# Patient Record
Sex: MALE | Race: OTHER | HISPANIC OR LATINO | URBAN - METROPOLITAN AREA
[De-identification: names, ages, dates, MRNs, and addresses within clinical notes are randomized per-mention and may not be internally consistent; named-entity substitution may affect disease eponyms.]

---

## 2022-06-24 ENCOUNTER — EMERGENCY (EMERGENCY)
Facility: HOSPITAL | Age: 57
LOS: 1 days | Discharge: HOME | End: 2022-06-24
Attending: EMERGENCY MEDICINE
Payer: SELF-PAY

## 2022-06-24 VITALS
SYSTOLIC BLOOD PRESSURE: 100 MMHG | OXYGEN SATURATION: 99 % | HEART RATE: 92 BPM | TEMPERATURE: 99 F | DIASTOLIC BLOOD PRESSURE: 55 MMHG | RESPIRATION RATE: 20 BRPM | WEIGHT: 174.17 LBS | HEIGHT: 71 IN

## 2022-06-24 PROCEDURE — 70486 CT MAXILLOFACIAL W/O DYE: CPT | Mod: 26,MA

## 2022-06-24 PROCEDURE — 90471 IMMUNIZATION ADMIN: CPT

## 2022-06-24 PROCEDURE — 99285 EMERGENCY DEPT VISIT HI MDM: CPT

## 2022-06-24 PROCEDURE — 73070 X-RAY EXAM OF ELBOW: CPT

## 2022-06-24 PROCEDURE — 72125 CT NECK SPINE W/O DYE: CPT | Mod: MA

## 2022-06-24 PROCEDURE — 73070 X-RAY EXAM OF ELBOW: CPT | Mod: 26,50

## 2022-06-24 PROCEDURE — 90715 TDAP VACCINE 7 YRS/> IM: CPT

## 2022-06-24 PROCEDURE — 72125 CT NECK SPINE W/O DYE: CPT | Mod: 26,MA

## 2022-06-24 PROCEDURE — 73562 X-RAY EXAM OF KNEE 3: CPT | Mod: 26,50

## 2022-06-24 PROCEDURE — 99284 EMERGENCY DEPT VISIT MOD MDM: CPT | Mod: 25

## 2022-06-24 PROCEDURE — 70450 CT HEAD/BRAIN W/O DYE: CPT | Mod: MA

## 2022-06-24 PROCEDURE — 70450 CT HEAD/BRAIN W/O DYE: CPT | Mod: 26,MA

## 2022-06-24 PROCEDURE — 70486 CT MAXILLOFACIAL W/O DYE: CPT | Mod: MA

## 2022-06-24 PROCEDURE — 73562 X-RAY EXAM OF KNEE 3: CPT

## 2022-06-24 RX ORDER — ACETAMINOPHEN 500 MG
650 TABLET ORAL ONCE
Refills: 0 | Status: COMPLETED | OUTPATIENT
Start: 2022-06-24 | End: 2022-06-24

## 2022-06-24 RX ORDER — TETANUS TOXOID, REDUCED DIPHTHERIA TOXOID AND ACELLULAR PERTUSSIS VACCINE, ADSORBED 5; 2.5; 8; 8; 2.5 [IU]/.5ML; [IU]/.5ML; UG/.5ML; UG/.5ML; UG/.5ML
0.5 SUSPENSION INTRAMUSCULAR ONCE
Refills: 0 | Status: COMPLETED | OUTPATIENT
Start: 2022-06-24 | End: 2022-06-24

## 2022-06-24 RX ADMIN — TETANUS TOXOID, REDUCED DIPHTHERIA TOXOID AND ACELLULAR PERTUSSIS VACCINE, ADSORBED 0.5 MILLILITER(S): 5; 2.5; 8; 8; 2.5 SUSPENSION INTRAMUSCULAR at 11:39

## 2022-06-24 RX ADMIN — Medication 650 MILLIGRAM(S): at 11:38
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
## 2022-06-24 NOTE — ED STATDOCS - CARE PROVIDER_API CALL
Opal Brasher)  Plastic Surgery  [address] [place] [address]  Phone: [phone]  Fax: [phone]  Follow Up Time: Urgent
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
## 2022-06-24 NOTE — ED STATDOCS - PATIENT PORTAL LINK FT
You can access the FollowMyHealth Patient Portal offered by [facility] by registering at the following website: [email or web address]. By joining [employer]’s FollowMyHealth portal, you will also be able to view your health information using other applications (apps) compatible with our system.
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
## 2022-06-24 NOTE — ED STATDOCS - MUSCULOSKELETAL, MLM
range of motion is not limited. (+) depression to right mandible w/ swelling. Able to move all extremities. Normal gait.
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
## 2023-11-19 ENCOUNTER — EMERGENCY (EMERGENCY)
Facility: HOSPITAL | Age: 58
LOS: 1 days | Discharge: HOME | End: 2023-11-19
Attending: STUDENT IN AN ORGANIZED HEALTH CARE EDUCATION/TRAINING PROGRAM
Payer: COMMERCIAL

## 2023-11-19 VITALS
WEIGHT: 176.15 LBS | HEIGHT: 70 IN | TEMPERATURE: 98 F | DIASTOLIC BLOOD PRESSURE: 74 MMHG | SYSTOLIC BLOOD PRESSURE: 119 MMHG | RESPIRATION RATE: 18 BRPM | OXYGEN SATURATION: 99 % | HEART RATE: 73 BPM

## 2023-11-19 LAB
ALBUMIN SERPL ELPH-MCNC: 4.1 G/DL — SIGNIFICANT CHANGE UP (ref 3.3–5.2)
ALBUMIN SERPL ELPH-MCNC: 4.1 G/DL — SIGNIFICANT CHANGE UP (ref 3.3–5.2)
ALP SERPL-CCNC: 90 U/L — SIGNIFICANT CHANGE UP (ref 40–120)
ALP SERPL-CCNC: 90 U/L — SIGNIFICANT CHANGE UP (ref 40–120)
ALT FLD-CCNC: 28 U/L — SIGNIFICANT CHANGE UP
ALT FLD-CCNC: 28 U/L — SIGNIFICANT CHANGE UP
ANION GAP SERPL CALC-SCNC: 10 MMOL/L — SIGNIFICANT CHANGE UP (ref 5–17)
ANION GAP SERPL CALC-SCNC: 10 MMOL/L — SIGNIFICANT CHANGE UP (ref 5–17)
AST SERPL-CCNC: 15 U/L — SIGNIFICANT CHANGE UP
AST SERPL-CCNC: 15 U/L — SIGNIFICANT CHANGE UP
BASOPHILS # BLD AUTO: 0.03 K/UL — SIGNIFICANT CHANGE UP (ref 0–0.2)
BASOPHILS # BLD AUTO: 0.03 K/UL — SIGNIFICANT CHANGE UP (ref 0–0.2)
BASOPHILS NFR BLD AUTO: 0.5 % — SIGNIFICANT CHANGE UP (ref 0–2)
BASOPHILS NFR BLD AUTO: 0.5 % — SIGNIFICANT CHANGE UP (ref 0–2)
BILIRUB SERPL-MCNC: 0.4 MG/DL — SIGNIFICANT CHANGE UP (ref 0.4–2)
BILIRUB SERPL-MCNC: 0.4 MG/DL — SIGNIFICANT CHANGE UP (ref 0.4–2)
BUN SERPL-MCNC: 22.4 MG/DL — HIGH (ref 8–20)
BUN SERPL-MCNC: 22.4 MG/DL — HIGH (ref 8–20)
CALCIUM SERPL-MCNC: 9 MG/DL — SIGNIFICANT CHANGE UP (ref 8.4–10.5)
CALCIUM SERPL-MCNC: 9 MG/DL — SIGNIFICANT CHANGE UP (ref 8.4–10.5)
CHLORIDE SERPL-SCNC: 98 MMOL/L — SIGNIFICANT CHANGE UP (ref 96–108)
CHLORIDE SERPL-SCNC: 98 MMOL/L — SIGNIFICANT CHANGE UP (ref 96–108)
CO2 SERPL-SCNC: 28 MMOL/L — SIGNIFICANT CHANGE UP (ref 22–29)
CO2 SERPL-SCNC: 28 MMOL/L — SIGNIFICANT CHANGE UP (ref 22–29)
CREAT SERPL-MCNC: 1.19 MG/DL — SIGNIFICANT CHANGE UP (ref 0.5–1.3)
CREAT SERPL-MCNC: 1.19 MG/DL — SIGNIFICANT CHANGE UP (ref 0.5–1.3)
EGFR: 71 ML/MIN/1.73M2 — SIGNIFICANT CHANGE UP
EGFR: 71 ML/MIN/1.73M2 — SIGNIFICANT CHANGE UP
EOSINOPHIL # BLD AUTO: 0.18 K/UL — SIGNIFICANT CHANGE UP (ref 0–0.5)
EOSINOPHIL # BLD AUTO: 0.18 K/UL — SIGNIFICANT CHANGE UP (ref 0–0.5)
EOSINOPHIL NFR BLD AUTO: 2.9 % — SIGNIFICANT CHANGE UP (ref 0–6)
EOSINOPHIL NFR BLD AUTO: 2.9 % — SIGNIFICANT CHANGE UP (ref 0–6)
GLUCOSE SERPL-MCNC: 261 MG/DL — HIGH (ref 70–99)
GLUCOSE SERPL-MCNC: 261 MG/DL — HIGH (ref 70–99)
HCT VFR BLD CALC: 38 % — LOW (ref 39–50)
HCT VFR BLD CALC: 38 % — LOW (ref 39–50)
HGB BLD-MCNC: 12.9 G/DL — LOW (ref 13–17)
HGB BLD-MCNC: 12.9 G/DL — LOW (ref 13–17)
IMM GRANULOCYTES NFR BLD AUTO: 0.2 % — SIGNIFICANT CHANGE UP (ref 0–0.9)
IMM GRANULOCYTES NFR BLD AUTO: 0.2 % — SIGNIFICANT CHANGE UP (ref 0–0.9)
LYMPHOCYTES # BLD AUTO: 1.4 K/UL — SIGNIFICANT CHANGE UP (ref 1–3.3)
LYMPHOCYTES # BLD AUTO: 1.4 K/UL — SIGNIFICANT CHANGE UP (ref 1–3.3)
LYMPHOCYTES # BLD AUTO: 22.5 % — SIGNIFICANT CHANGE UP (ref 13–44)
LYMPHOCYTES # BLD AUTO: 22.5 % — SIGNIFICANT CHANGE UP (ref 13–44)
MAGNESIUM SERPL-MCNC: 1.9 MG/DL — SIGNIFICANT CHANGE UP (ref 1.6–2.6)
MAGNESIUM SERPL-MCNC: 1.9 MG/DL — SIGNIFICANT CHANGE UP (ref 1.6–2.6)
MCHC RBC-ENTMCNC: 29.7 PG — SIGNIFICANT CHANGE UP (ref 27–34)
MCHC RBC-ENTMCNC: 29.7 PG — SIGNIFICANT CHANGE UP (ref 27–34)
MCHC RBC-ENTMCNC: 33.9 GM/DL — SIGNIFICANT CHANGE UP (ref 32–36)
MCHC RBC-ENTMCNC: 33.9 GM/DL — SIGNIFICANT CHANGE UP (ref 32–36)
MCV RBC AUTO: 87.4 FL — SIGNIFICANT CHANGE UP (ref 80–100)
MCV RBC AUTO: 87.4 FL — SIGNIFICANT CHANGE UP (ref 80–100)
MONOCYTES # BLD AUTO: 0.43 K/UL — SIGNIFICANT CHANGE UP (ref 0–0.9)
MONOCYTES # BLD AUTO: 0.43 K/UL — SIGNIFICANT CHANGE UP (ref 0–0.9)
MONOCYTES NFR BLD AUTO: 6.9 % — SIGNIFICANT CHANGE UP (ref 2–14)
MONOCYTES NFR BLD AUTO: 6.9 % — SIGNIFICANT CHANGE UP (ref 2–14)
NEUTROPHILS # BLD AUTO: 4.17 K/UL — SIGNIFICANT CHANGE UP (ref 1.8–7.4)
NEUTROPHILS # BLD AUTO: 4.17 K/UL — SIGNIFICANT CHANGE UP (ref 1.8–7.4)
NEUTROPHILS NFR BLD AUTO: 67 % — SIGNIFICANT CHANGE UP (ref 43–77)
NEUTROPHILS NFR BLD AUTO: 67 % — SIGNIFICANT CHANGE UP (ref 43–77)
PLATELET # BLD AUTO: 185 K/UL — SIGNIFICANT CHANGE UP (ref 150–400)
PLATELET # BLD AUTO: 185 K/UL — SIGNIFICANT CHANGE UP (ref 150–400)
POTASSIUM SERPL-MCNC: 4.7 MMOL/L — SIGNIFICANT CHANGE UP (ref 3.5–5.3)
POTASSIUM SERPL-MCNC: 4.7 MMOL/L — SIGNIFICANT CHANGE UP (ref 3.5–5.3)
POTASSIUM SERPL-SCNC: 4.7 MMOL/L — SIGNIFICANT CHANGE UP (ref 3.5–5.3)
POTASSIUM SERPL-SCNC: 4.7 MMOL/L — SIGNIFICANT CHANGE UP (ref 3.5–5.3)
PROT SERPL-MCNC: 6.6 G/DL — SIGNIFICANT CHANGE UP (ref 6.6–8.7)
PROT SERPL-MCNC: 6.6 G/DL — SIGNIFICANT CHANGE UP (ref 6.6–8.7)
RBC # BLD: 4.35 M/UL — SIGNIFICANT CHANGE UP (ref 4.2–5.8)
RBC # BLD: 4.35 M/UL — SIGNIFICANT CHANGE UP (ref 4.2–5.8)
RBC # FLD: 12.9 % — SIGNIFICANT CHANGE UP (ref 10.3–14.5)
RBC # FLD: 12.9 % — SIGNIFICANT CHANGE UP (ref 10.3–14.5)
SODIUM SERPL-SCNC: 136 MMOL/L — SIGNIFICANT CHANGE UP (ref 135–145)
SODIUM SERPL-SCNC: 136 MMOL/L — SIGNIFICANT CHANGE UP (ref 135–145)
WBC # BLD: 6.22 K/UL — SIGNIFICANT CHANGE UP (ref 3.8–10.5)
WBC # BLD: 6.22 K/UL — SIGNIFICANT CHANGE UP (ref 3.8–10.5)
WBC # FLD AUTO: 6.22 K/UL — SIGNIFICANT CHANGE UP (ref 3.8–10.5)
WBC # FLD AUTO: 6.22 K/UL — SIGNIFICANT CHANGE UP (ref 3.8–10.5)

## 2023-11-19 PROCEDURE — 96374 THER/PROPH/DIAG INJ IV PUSH: CPT

## 2023-11-19 PROCEDURE — 85025 COMPLETE CBC W/AUTO DIFF WBC: CPT

## 2023-11-19 PROCEDURE — 36415 COLL VENOUS BLD VENIPUNCTURE: CPT

## 2023-11-19 PROCEDURE — 70450 CT HEAD/BRAIN W/O DYE: CPT | Mod: 26,QQ

## 2023-11-19 PROCEDURE — 99284 EMERGENCY DEPT VISIT MOD MDM: CPT | Mod: 25

## 2023-11-19 PROCEDURE — 80053 COMPREHEN METABOLIC PANEL: CPT

## 2023-11-19 PROCEDURE — 70450 CT HEAD/BRAIN W/O DYE: CPT | Mod: QQ

## 2023-11-19 PROCEDURE — 83735 ASSAY OF MAGNESIUM: CPT

## 2023-11-19 PROCEDURE — 99284 EMERGENCY DEPT VISIT MOD MDM: CPT

## 2023-11-19 RX ORDER — METOCLOPRAMIDE HCL 10 MG
10 TABLET ORAL ONCE
Refills: 0 | Status: COMPLETED | OUTPATIENT
Start: 2023-11-19 | End: 2023-11-19

## 2023-11-19 RX ORDER — SODIUM CHLORIDE 9 MG/ML
1000 INJECTION, SOLUTION INTRAVENOUS ONCE
Refills: 0 | Status: COMPLETED | OUTPATIENT
Start: 2023-11-19 | End: 2023-11-19

## 2023-11-19 RX ADMIN — Medication 10 MILLIGRAM(S): at 19:18

## 2023-11-19 RX ADMIN — SODIUM CHLORIDE 1000 MILLILITER(S): 9 INJECTION, SOLUTION INTRAVENOUS at 19:18

## 2023-11-19 RX ADMIN — SODIUM CHLORIDE 1000 MILLILITER(S): 9 INJECTION, SOLUTION INTRAVENOUS at 20:02

## 2023-11-19 NOTE — ED PROVIDER NOTE - NSFOLLOWUPINSTRUCTIONS_ED_ALL_ED_FT
1) Follow up with your doctor in 1-2 days  2) Return to the ER for worsening or concerning symptoms    Dolor de noemi general sin causa  General Headache Without Cause    El dolor de noemi es un dolor o malestar que se siente en la jennifer de la noemi o del johana. Puede no tener maynor causa específica. Hay muchas causas y tipos de alon de noemi. Los alon de noemi más comunes son los siguientes:    Cefaleas tensionales.  Cefaleas migrañosas.  Cefalea en brotes.  Cefaleas diarias crónicas.    Siga estas indicaciones en gayle casa:  Controle gayle afección para detectar cualquier cambio. Informe a gayle médico acerca de cualquier cambio. Siga estos pasos para controlar gayle afección:        Control del dolor      [place] los medicamentos de venta sandy y los recetados solamente alexis se lo haya indicado el médico.  Cuando sienta dolor de noemi acuéstese en un cuarto oscuro y tranquilo.  Si se lo indican, aplíquese hielo en la noemi y en la jennifer del johana:    Ponga el hielo en maynor bolsa plástica.  Coloque maynor toalla entre la piel y la bolsa.  Coloque el hielo дмитрий 20 minutos, 2 a 3 veces al día.  Si se lo indican, aplique calor en la jennifer afectada. Use la bonnie de calor que el médico le recomiende, alexis maynor compresa de calor húmedo o maynor almohadilla térmica.    Coloque maynor toalla entre la piel y la bonnie de calor.  Aplique calor дмитрий 20 a 30 minutos.  Retire la bonnie de calor si la piel se pone de color limon brillante. [place] es especialmente importante si no puede sentir dolor, calor o frío. Puede correr un riesgo mayor de sufrir quemaduras.  Mantenga las luces tenues si las luces brillantes le molestan o bairon alon de noemi empeoran.        Comida y bebida    Mantenga un horario para las comidas.  Si yuliana alcohol:    Limite la cantidad que yuliana a lo siguiente:     De 0 a 1 medida por día para las mujeres.   De 0 a 2 medidas por día para los hombres.   Esté atento a la cantidad de alcohol que hay en las bebidas que beronica. En los [place], maynor medida equivale a maynor botella de cerveza de 12 oz (355 ml), un vaso de vino de 5 oz (148 ml) o un vaso de maynor bebida alcohólica de stephanie graduación de 1½ oz (44 ml).  Deje de damon cafeína o disminuya la cantidad que consume.        Indicaciones generales     Lleve un diario de los alon de noemi para averiguar qué factores pueden desencadenarlos. Registre, por ejemplo, lo siguiente:    Lo que usted come y yuliana.  El tiempo que duerme.  Algún cambio en gayle dieta o en los medicamentos.  Pruebe algunas técnicas de relajación, alexis los masajes.  Limite el estrés.  Siéntese con la espalda recta y no tense los músculos.  No consuma ningún producto que contenga nicotina o tabaco, alexis cigarrillos, cigarrillos electrónicos y tabaco de mascar. Si necesita ayuda para dejar de consumir, consulte al médico.  Talha actividad física habitualmente alexis se lo haya indicado el médico.  Tenga un horario fijo para dormir. [place]erma entre 7 y 9 horas todas las noches o la cantidad de horas que le haya recomendado el médico.  Concurra a todas las visitas de control alexis se lo haya indicado el médico. [place] es importante.    Comuníquese con un médico si:  Los medicamentos no logran aliviar los síntomas.  Tiene un dolor de noemi que es diferente del dolor de noemi habitual.  Tiene náuseas o vómitos.  Tiene fiebre.    Solicite ayuda inmediatamente si:  El dolor se vuelve intenso rápidamente.  El dolor empeora después de hacer actividad física moderada o intensa.  Ha vomitado repetidas veces.  Presenta rigidez en el johana.  Sufre pérdida de la visión.  Tiene problemas para hablar.  Siente dolor en el delmer o en el oído.  Presenta debilidad muscular o pérdida del control muscular.  Pierde el equilibrio o tiene problemas para caminar.  Sufre mareos o se desmaya.  Experimenta confusión.  Tiene maynor convulsión.    Resumen  El dolor de noemi es un dolor o malestar que se siente en la jennifer de la noemi o del johana.  Hay muchas causas y tipos de alon de noemi. En algunos casos, es posible que no se encuentre la causa.  Lleve un diario de los alon de noemi para averiguar qué factores pueden desencadenarlos. Controle gayle afección para detectar cualquier cambio. Informe a gayle médico acerca de cualquier cambio.  Comuníquese con un médico si tiene un dolor de noemi que es diferente de lo habitual o si los síntomas no se alivian con los medicamentos.  Solicite ayuda de inmediato si el dolor se vuelve intenso, vomita, tiene pérdida de la visión, pierde el equilibrio o tiene maynor convulsión.    NOTAS ADICIONALES E INSTRUCCIONES    Please follow up with your Primary MD in 24-48 hr.  Seek immediate medical care for any new/worsening signs or symptoms.     Document Released: 12/18/2006 Document Revised: 7/8/2019 Document Reviewed: 7/8/2019

## 2023-11-19 NOTE — ED PROVIDER NOTE - PATIENT PORTAL LINK FT
You can access the FollowMyHealth Patient Portal offered by [facility] by registering at the following website: [email or web address]. By joining [employer]’s FollowMyHealth portal, you will also be able to view your health information using other applications (apps) compatible with our system.

## 2023-11-19 NOTE — ED ADULT NURSE NOTE - NSFALLUNIVINTERV_ED_ALL_ED
Bed/Stretcher in lowest position, wheels locked, appropriate side rails in place/Call bell, personal items and telephone in reach/Instruct patient to call for assistance before getting out of bed/chair/stretcher/Non-slip footwear applied when patient is off stretcher/[place] to call system/Physically safe environment - no spills, clutter or unnecessary equipment/Purposeful proactive rounding/Room/bathroom lighting operational, light cord in reach

## 2023-11-19 NOTE — ED ADULT NURSE REASSESSMENT NOTE - NS ED NURSE REASSESS COMMENT FT1
received report from previous RN, assumed care of pt at approx 2300.  pt is here for headache.  is Indonesian speaking, aox4.  awaiting CT scan.  no complaints at this time.  family member at bedside.

## 2023-11-19 NOTE — ED PROVIDER NOTE - PHYSICAL EXAMINATION
Const: Awake, alert and oriented. In no acute distress. Well appearing.  HEENT: NC/AT. Moist mucous membranes.  Eyes: No scleral icterus. EOMI. unequal pupils (ho cataract surgery)  Neck:. Soft and supple. Full ROM without pain.  Cardiac: Regular rate and regular rhythm. +S1/S2. Peripheral pulses 2+ and symmetric. No LE edema.  Resp: Speaking in full sentences. No evidence of respiratory distress. No wheezes, rales or rhonchi.  Abd: Soft, non-tender, non-distended. Normal bowel sounds in all 4 quadrants. No guarding or rebound.  Back: Spine midline and non-tender. No CVAT.  Skin: No rashes, abrasions or lacerations.  Lymph: No cervical lymphadenopathy.  Neuro: A&Ox3, moving all extremities symmetrically, follows commands, motor jessica upper and lower ext 5/5, sensory symm and intact CN 2-12 grossly intact, no ataxia, no nystagmus, no dysmetria, no ddk, symm jessica, no pronator drift

## 2023-11-19 NOTE — ED PROVIDER NOTE - OBJECTIVE STATEMENT
58yo male with pmh of cataracts surgery, HTN, DM presents with HA for a week. Pt states for the past week with a HA along with blurry vision. Pt states he had good clear vision on the right after cataract surgery sept 2022, and blurry vision on the left eye, now feels like the left eye is better and the right eye is blurry. Pt states the ha is all over worse when getting up, better with laying down. Pt denies fevers/chills, loc, cp/sob/palp, cough, abd pain/n/v/d, urinary symptoms, recent travel and sick contacts.

## 2023-11-20 VITALS
SYSTOLIC BLOOD PRESSURE: 137 MMHG | RESPIRATION RATE: 18 BRPM | HEART RATE: 71 BPM | TEMPERATURE: 98 F | OXYGEN SATURATION: 97 % | DIASTOLIC BLOOD PRESSURE: 90 MMHG

## 2023-11-20 PROBLEM — E11.9 TYPE 2 DIABETES MELLITUS WITHOUT COMPLICATIONS: Chronic | Status: ACTIVE | Noted: 2022-06-24

## 2023-11-29 ENCOUNTER — OFFICE (OUTPATIENT)
Dept: URBAN - METROPOLITAN AREA CLINIC 94 | Facility: CLINIC | Age: 58
Setting detail: OPHTHALMOLOGY
End: 2023-11-29
Payer: COMMERCIAL

## 2023-11-29 DIAGNOSIS — E11.3512: ICD-10-CM

## 2023-11-29 DIAGNOSIS — E11.3513: ICD-10-CM

## 2023-11-29 DIAGNOSIS — H40.89: ICD-10-CM

## 2023-11-29 DIAGNOSIS — E11.3511: ICD-10-CM

## 2023-11-29 PROBLEM — H26.493 POSTERIOR CAPSULAR OPACIFICATION; BOTH EYES: Status: ACTIVE | Noted: 2023-11-29

## 2023-11-29 PROCEDURE — 92134 CPTRZ OPH DX IMG PST SGM RTA: CPT | Performed by: OPHTHALMOLOGY

## 2023-11-29 PROCEDURE — 67028 INJECTION EYE DRUG: CPT | Mod: RT | Performed by: OPHTHALMOLOGY

## 2023-11-29 PROCEDURE — 92235 FLUORESCEIN ANGRPH MLTIFRAME: CPT | Performed by: OPHTHALMOLOGY

## 2023-11-29 ASSESSMENT — REFRACTION_AUTOREFRACTION
OD_CYLINDER: -1.00
OS_SPHERE: +0.25
OD_AXIS: 140
OD_SPHERE: -1.25
OS_AXIS: 111
OS_CYLINDER: -0.50

## 2023-11-29 ASSESSMENT — CONFRONTATIONAL VISUAL FIELD TEST (CVF)
OS_FINDINGS: FULL
OD_FINDINGS: FULL

## 2023-11-29 ASSESSMENT — SPHEQUIV_DERIVED
OD_SPHEQUIV: -1.75
OS_SPHEQUIV: 0

## 2023-12-03 ENCOUNTER — RX ONLY (RX ONLY)
Age: 58
End: 2023-12-03

## 2023-12-03 ENCOUNTER — OFFICE (OUTPATIENT)
Dept: URBAN - METROPOLITAN AREA CLINIC 94 | Facility: CLINIC | Age: 58
Setting detail: OPHTHALMOLOGY
End: 2023-12-03
Payer: COMMERCIAL

## 2023-12-03 DIAGNOSIS — E11.3511: ICD-10-CM

## 2023-12-03 DIAGNOSIS — H40.89: ICD-10-CM

## 2023-12-03 PROCEDURE — 99212 OFFICE O/P EST SF 10 MIN: CPT | Performed by: REGISTERED NURSE

## 2023-12-03 ASSESSMENT — REFRACTION_AUTOREFRACTION
OS_CYLINDER: -1.25
OS_AXIS: 120
OD_SPHERE: UTP
OS_SPHERE: +1.00

## 2023-12-03 ASSESSMENT — CONFRONTATIONAL VISUAL FIELD TEST (CVF)
OD_FINDINGS: FULL
OS_FINDINGS: FULL

## 2023-12-03 ASSESSMENT — SPHEQUIV_DERIVED: OS_SPHEQUIV: 0.375

## 2023-12-06 ENCOUNTER — ASC (OUTPATIENT)
Dept: URBAN - METROPOLITAN AREA SURGERY 8 | Facility: SURGERY | Age: 58
Setting detail: OPHTHALMOLOGY
End: 2023-12-06
Payer: COMMERCIAL

## 2023-12-06 DIAGNOSIS — E11.3511: ICD-10-CM

## 2023-12-06 PROCEDURE — 67210 TREATMENT OF RETINAL LESION: CPT | Mod: RT | Performed by: OPHTHALMOLOGY

## 2023-12-06 ASSESSMENT — CONFRONTATIONAL VISUAL FIELD TEST (CVF)
OD_FINDINGS: FULL
OS_FINDINGS: FULL

## 2023-12-06 ASSESSMENT — SPHEQUIV_DERIVED: OS_SPHEQUIV: 0.375

## 2023-12-06 ASSESSMENT — REFRACTION_AUTOREFRACTION
OD_SPHERE: UTP
OS_CYLINDER: -1.25
OS_SPHERE: +1.00
OS_AXIS: 120

## 2023-12-12 ENCOUNTER — ASC (OUTPATIENT)
Dept: URBAN - METROPOLITAN AREA SURGERY 8 | Facility: SURGERY | Age: 58
Setting detail: OPHTHALMOLOGY
End: 2023-12-12
Payer: COMMERCIAL

## 2023-12-12 DIAGNOSIS — E11.3512: ICD-10-CM

## 2023-12-12 PROCEDURE — 67210 TREATMENT OF RETINAL LESION: CPT | Mod: 79,LT | Performed by: OPHTHALMOLOGY

## 2023-12-12 ASSESSMENT — REFRACTION_AUTOREFRACTION
OS_AXIS: 120
OS_SPHERE: +1.00
OD_SPHERE: UTP
OS_CYLINDER: -1.25

## 2023-12-12 ASSESSMENT — CONFRONTATIONAL VISUAL FIELD TEST (CVF)
OS_FINDINGS: FULL
OD_FINDINGS: FULL

## 2023-12-12 ASSESSMENT — SPHEQUIV_DERIVED: OS_SPHEQUIV: 0.375

## 2024-01-02 ENCOUNTER — OFFICE (OUTPATIENT)
Dept: URBAN - METROPOLITAN AREA CLINIC 94 | Facility: CLINIC | Age: 59
Setting detail: OPHTHALMOLOGY
End: 2024-01-02
Payer: COMMERCIAL

## 2024-01-02 DIAGNOSIS — E11.3513: ICD-10-CM

## 2024-01-02 DIAGNOSIS — E11.3512: ICD-10-CM

## 2024-01-02 PROBLEM — E11.3511 DM TYPE 2; RIGHT PROLIFERATIVE WITH ME, LEFT PROLIFERATIVE WITH ME: Status: ACTIVE | Noted: 2023-12-06

## 2024-01-02 PROCEDURE — 92134 CPTRZ OPH DX IMG PST SGM RTA: CPT | Performed by: OPHTHALMOLOGY

## 2024-01-02 PROCEDURE — 67028 INJECTION EYE DRUG: CPT | Mod: 58,LT | Performed by: OPHTHALMOLOGY

## 2024-01-02 ASSESSMENT — CONFRONTATIONAL VISUAL FIELD TEST (CVF)
OD_FINDINGS: FULL
OS_FINDINGS: FULL

## 2024-01-02 ASSESSMENT — REFRACTION_AUTOREFRACTION
OS_CYLINDER: -1.25
OS_AXIS: 120
OS_SPHERE: +1.00
OD_SPHERE: UTP

## 2024-01-02 ASSESSMENT — SPHEQUIV_DERIVED: OS_SPHEQUIV: 0.375

## 2024-01-24 ENCOUNTER — OFFICE (OUTPATIENT)
Dept: URBAN - METROPOLITAN AREA CLINIC 94 | Facility: CLINIC | Age: 59
Setting detail: OPHTHALMOLOGY
End: 2024-01-24
Payer: COMMERCIAL

## 2024-01-24 DIAGNOSIS — H40.89: ICD-10-CM

## 2024-01-24 DIAGNOSIS — E11.3511: ICD-10-CM

## 2024-01-24 PROCEDURE — 67028 INJECTION EYE DRUG: CPT | Mod: 58,RT | Performed by: OPHTHALMOLOGY

## 2024-01-24 PROCEDURE — 92134 CPTRZ OPH DX IMG PST SGM RTA: CPT | Performed by: OPHTHALMOLOGY

## 2024-01-24 ASSESSMENT — REFRACTION_AUTOREFRACTION
OS_CYLINDER: -1.25
OS_SPHERE: +1.00
OS_AXIS: 120
OD_SPHERE: UTP

## 2024-01-24 ASSESSMENT — SPHEQUIV_DERIVED: OS_SPHEQUIV: 0.375

## 2024-01-24 ASSESSMENT — CONFRONTATIONAL VISUAL FIELD TEST (CVF)
OD_FINDINGS: FULL
OS_FINDINGS: FULL

## 2024-02-07 ENCOUNTER — OFFICE (OUTPATIENT)
Dept: URBAN - METROPOLITAN AREA CLINIC 94 | Facility: CLINIC | Age: 59
Setting detail: OPHTHALMOLOGY
End: 2024-02-07
Payer: COMMERCIAL

## 2024-02-07 DIAGNOSIS — H40.89: ICD-10-CM

## 2024-02-07 DIAGNOSIS — E11.3512: ICD-10-CM

## 2024-02-07 DIAGNOSIS — E11.3513: ICD-10-CM

## 2024-02-07 PROCEDURE — 67028 INJECTION EYE DRUG: CPT | Mod: 58,LT | Performed by: OPHTHALMOLOGY

## 2024-02-07 PROCEDURE — 92134 CPTRZ OPH DX IMG PST SGM RTA: CPT | Performed by: OPHTHALMOLOGY

## 2024-02-07 ASSESSMENT — CONFRONTATIONAL VISUAL FIELD TEST (CVF)
OD_FINDINGS: FULL
OS_FINDINGS: FULL

## 2024-02-09 ASSESSMENT — REFRACTION_AUTOREFRACTION
OS_AXIS: 120
OS_CYLINDER: -1.25
OS_SPHERE: +1.00
OD_SPHERE: UTP

## 2024-02-09 ASSESSMENT — SPHEQUIV_DERIVED: OS_SPHEQUIV: 0.375

## 2024-02-16 ENCOUNTER — OFFICE (OUTPATIENT)
Dept: URBAN - METROPOLITAN AREA CLINIC 94 | Facility: CLINIC | Age: 59
Setting detail: OPHTHALMOLOGY
End: 2024-02-16
Payer: COMMERCIAL

## 2024-02-16 DIAGNOSIS — H40.89: ICD-10-CM

## 2024-02-16 PROCEDURE — 99214 OFFICE O/P EST MOD 30 MIN: CPT | Mod: 24 | Performed by: OPHTHALMOLOGY

## 2024-02-16 ASSESSMENT — SPHEQUIV_DERIVED
OD_SPHEQUIV: -1.5
OS_SPHEQUIV: 0

## 2024-02-16 ASSESSMENT — REFRACTION_AUTOREFRACTION
OD_CYLINDER: -2.50
OD_SPHERE: -0.25
OS_AXIS: 122
OS_SPHERE: +0.50
OS_CYLINDER: -1.00
OD_AXIS: 150

## 2024-02-16 ASSESSMENT — CONFRONTATIONAL VISUAL FIELD TEST (CVF)
OS_FINDINGS: FULL
OD_FINDINGS: FULL

## 2024-03-01 ENCOUNTER — ASC (OUTPATIENT)
Dept: URBAN - METROPOLITAN AREA SURGERY 8 | Facility: SURGERY | Age: 59
Setting detail: OPHTHALMOLOGY
End: 2024-03-01
Payer: COMMERCIAL

## 2024-03-01 DIAGNOSIS — H40.1113: ICD-10-CM

## 2024-03-01 PROCEDURE — 66710 CILIARY TRANSSLERAL THERAPY: CPT | Mod: 79,RT | Performed by: OPHTHALMOLOGY

## 2024-03-06 PROBLEM — Z00.00 ENCOUNTER FOR PREVENTIVE HEALTH EXAMINATION: Status: ACTIVE | Noted: 2024-03-06

## 2024-03-08 ENCOUNTER — OFFICE (OUTPATIENT)
Dept: URBAN - METROPOLITAN AREA CLINIC 94 | Facility: CLINIC | Age: 59
Setting detail: OPHTHALMOLOGY
End: 2024-03-08
Payer: COMMERCIAL

## 2024-03-08 ENCOUNTER — RX ONLY (RX ONLY)
Age: 59
End: 2024-03-08

## 2024-03-08 DIAGNOSIS — H40.89: ICD-10-CM

## 2024-03-08 DIAGNOSIS — H26.492: ICD-10-CM

## 2024-03-08 PROCEDURE — 99024 POSTOP FOLLOW-UP VISIT: CPT | Performed by: PHYSICIAN ASSISTANT

## 2024-03-27 ENCOUNTER — OFFICE (OUTPATIENT)
Dept: URBAN - METROPOLITAN AREA CLINIC 94 | Facility: CLINIC | Age: 59
Setting detail: OPHTHALMOLOGY
End: 2024-03-27
Payer: COMMERCIAL

## 2024-03-27 DIAGNOSIS — E11.3512: ICD-10-CM

## 2024-03-27 DIAGNOSIS — E11.3513: ICD-10-CM

## 2024-03-27 PROCEDURE — 92012 INTRM OPH EXAM EST PATIENT: CPT | Mod: 57 | Performed by: OPHTHALMOLOGY

## 2024-03-27 PROCEDURE — 92134 CPTRZ OPH DX IMG PST SGM RTA: CPT | Performed by: OPHTHALMOLOGY

## 2024-03-27 PROCEDURE — 67210 TREATMENT OF RETINAL LESION: CPT | Mod: LT | Performed by: OPHTHALMOLOGY

## 2024-03-28 ENCOUNTER — OFFICE (OUTPATIENT)
Dept: URBAN - METROPOLITAN AREA CLINIC 94 | Facility: CLINIC | Age: 59
Setting detail: OPHTHALMOLOGY
End: 2024-03-28
Payer: COMMERCIAL

## 2024-03-28 DIAGNOSIS — H35.033: ICD-10-CM

## 2024-03-28 DIAGNOSIS — H40.89: ICD-10-CM

## 2024-03-28 DIAGNOSIS — H16.041: ICD-10-CM

## 2024-03-28 PROCEDURE — 92285 EXTERNAL OCULAR PHOTOGRAPHY: CPT | Performed by: OPHTHALMOLOGY

## 2024-03-28 PROCEDURE — 99214 OFFICE O/P EST MOD 30 MIN: CPT | Mod: 25 | Performed by: OPHTHALMOLOGY

## 2024-03-29 ENCOUNTER — OFFICE (OUTPATIENT)
Dept: URBAN - METROPOLITAN AREA CLINIC 94 | Facility: CLINIC | Age: 59
Setting detail: OPHTHALMOLOGY
End: 2024-03-29
Payer: COMMERCIAL

## 2024-03-29 DIAGNOSIS — H16.041: ICD-10-CM

## 2024-03-29 DIAGNOSIS — H40.89: ICD-10-CM

## 2024-03-29 DIAGNOSIS — H35.033: ICD-10-CM

## 2024-03-29 PROCEDURE — 92012 INTRM OPH EXAM EST PATIENT: CPT | Mod: 24 | Performed by: OPHTHALMOLOGY

## 2024-03-30 ENCOUNTER — AMBULATORY SURGERY (OUTPATIENT)
Dept: URBAN - METROPOLITAN AREA SURGERY 2 | Facility: SURGERY | Age: 59
Setting detail: OPHTHALMOLOGY
End: 2024-03-30
Payer: COMMERCIAL

## 2024-03-30 DIAGNOSIS — H26.492: ICD-10-CM

## 2024-03-30 PROBLEM — H16.041 CORNEAL ULCER; RIGHT EYE: Status: ACTIVE | Noted: 2024-03-28

## 2024-03-30 PROBLEM — H26.491 POSTERIOR CAPSULAR OPACIFICATION; RIGHT EYE, LEFT EYE: Status: ACTIVE | Noted: 2024-03-30

## 2024-03-30 PROBLEM — H16.221 DRY EYE SYNDROME K SICCA; RIGHT EYE: Status: ACTIVE | Noted: 2024-03-08

## 2024-03-30 PROBLEM — S05.02XA CORNEAL ABRASION; INITIAL ENCOUNTER: Status: ACTIVE | Noted: 2024-03-27

## 2024-03-30 PROCEDURE — 66821 AFTER CATARACT LASER SURGERY: CPT | Mod: 79,LT | Performed by: OPHTHALMOLOGY

## 2024-04-03 ENCOUNTER — OFFICE (OUTPATIENT)
Dept: URBAN - METROPOLITAN AREA CLINIC 94 | Facility: CLINIC | Age: 59
Setting detail: OPHTHALMOLOGY
End: 2024-04-03
Payer: COMMERCIAL

## 2024-04-03 DIAGNOSIS — H35.033: ICD-10-CM

## 2024-04-03 DIAGNOSIS — E11.3512: ICD-10-CM

## 2024-04-03 PROCEDURE — 92134 CPTRZ OPH DX IMG PST SGM RTA: CPT | Performed by: OPHTHALMOLOGY

## 2024-04-03 PROCEDURE — 67028 INJECTION EYE DRUG: CPT | Mod: 58,LT | Performed by: OPHTHALMOLOGY

## 2024-04-04 ENCOUNTER — OFFICE (OUTPATIENT)
Dept: URBAN - METROPOLITAN AREA CLINIC 112 | Facility: CLINIC | Age: 59
Setting detail: OPHTHALMOLOGY
End: 2024-04-04
Payer: COMMERCIAL

## 2024-04-04 DIAGNOSIS — H16.223: ICD-10-CM

## 2024-04-04 DIAGNOSIS — H02.012: ICD-10-CM

## 2024-04-04 PROCEDURE — 99213 OFFICE O/P EST LOW 20 MIN: CPT | Mod: 24,25 | Performed by: OPHTHALMOLOGY

## 2024-04-04 PROCEDURE — 67820 REVISE EYELASHES: CPT | Mod: 79,E4 | Performed by: OPHTHALMOLOGY

## 2024-04-04 ASSESSMENT — LID EXAM ASSESSMENTS
OD_COMMENTS: 1 LASH K TOUCHING S/P EPILATION
OD_TRICHIASIS: RLL T

## 2024-04-18 ENCOUNTER — OFFICE (OUTPATIENT)
Dept: URBAN - METROPOLITAN AREA CLINIC 112 | Facility: CLINIC | Age: 59
Setting detail: OPHTHALMOLOGY
End: 2024-04-18
Payer: COMMERCIAL

## 2024-04-18 DIAGNOSIS — H16.222: ICD-10-CM

## 2024-04-18 DIAGNOSIS — H16.223: ICD-10-CM

## 2024-04-18 PROCEDURE — 83861 MICROFLUID ANALY TEARS: CPT | Mod: QW | Performed by: OPHTHALMOLOGY

## 2024-04-18 PROCEDURE — 99213 OFFICE O/P EST LOW 20 MIN: CPT | Mod: 24 | Performed by: OPHTHALMOLOGY

## 2024-04-18 PROCEDURE — 83861 MICROFLUID ANALY TEARS: CPT | Mod: QW,LT | Performed by: OPHTHALMOLOGY

## 2024-04-18 ASSESSMENT — LID EXAM ASSESSMENTS
OD_COMMENTS: 1 LASH K TOUCHING S/P EPILATION
OD_TRICHIASIS: RLL T

## 2024-04-24 ENCOUNTER — OFFICE (OUTPATIENT)
Dept: URBAN - METROPOLITAN AREA CLINIC 94 | Facility: CLINIC | Age: 59
Setting detail: OPHTHALMOLOGY
End: 2024-04-24
Payer: COMMERCIAL

## 2024-04-24 DIAGNOSIS — E11.3513: ICD-10-CM

## 2024-04-24 DIAGNOSIS — E11.3512: ICD-10-CM

## 2024-04-24 DIAGNOSIS — H35.033: ICD-10-CM

## 2024-04-24 DIAGNOSIS — E11.3511: ICD-10-CM

## 2024-04-24 PROCEDURE — 92134 CPTRZ OPH DX IMG PST SGM RTA: CPT | Performed by: OPHTHALMOLOGY

## 2024-04-24 PROCEDURE — 99024 POSTOP FOLLOW-UP VISIT: CPT | Performed by: OPHTHALMOLOGY

## 2024-04-24 ASSESSMENT — LID EXAM ASSESSMENTS
OD_COMMENTS: 1 LASH K TOUCHING S/P EPILATION
OD_TRICHIASIS: RLL T

## 2024-05-02 PROBLEM — H16.221 DRY EYE SYNDROME K SICCA; RIGHT EYE, LEFT EYE, BOTH EYES: Status: ACTIVE | Noted: 2024-04-24

## 2024-05-02 PROBLEM — H16.223 DRY EYE SYNDROME K SICCA; RIGHT EYE, LEFT EYE, BOTH EYES: Status: ACTIVE | Noted: 2024-04-24

## 2024-05-02 PROBLEM — H02.012 TRICHIASIS; RIGHT LOWER LID: Status: ACTIVE | Noted: 2024-04-04

## 2024-05-02 PROBLEM — H26.493 POSTERIOR CAPSULAR OPACIFICATION; ,, BOTH EYES: Status: ACTIVE | Noted: 2024-04-04

## 2024-05-02 PROBLEM — H16.222 DRY EYE SYNDROME K SICCA; RIGHT EYE, LEFT EYE, BOTH EYES: Status: ACTIVE | Noted: 2024-04-24

## 2024-05-03 ENCOUNTER — OFFICE (OUTPATIENT)
Dept: URBAN - METROPOLITAN AREA CLINIC 94 | Facility: CLINIC | Age: 59
Setting detail: OPHTHALMOLOGY
End: 2024-05-03
Payer: COMMERCIAL

## 2024-05-03 DIAGNOSIS — Z96.1: ICD-10-CM

## 2024-05-03 DIAGNOSIS — H40.89: ICD-10-CM

## 2024-05-03 PROCEDURE — 99024 POSTOP FOLLOW-UP VISIT: CPT | Performed by: OPHTHALMOLOGY

## 2024-05-03 ASSESSMENT — LID EXAM ASSESSMENTS
OD_COMMENTS: 1 LASH K TOUCHING S/P EPILATION
OD_TRICHIASIS: RLL T

## 2024-05-03 ASSESSMENT — CONFRONTATIONAL VISUAL FIELD TEST (CVF)
OD_FINDINGS: FULL
OS_FINDINGS: FULL

## 2024-05-11 ENCOUNTER — OFFICE (OUTPATIENT)
Dept: URBAN - METROPOLITAN AREA CLINIC 94 | Facility: CLINIC | Age: 59
Setting detail: OPHTHALMOLOGY
End: 2024-05-11
Payer: COMMERCIAL

## 2024-05-11 DIAGNOSIS — E11.3513: ICD-10-CM

## 2024-05-11 DIAGNOSIS — E11.3512: ICD-10-CM

## 2024-05-11 DIAGNOSIS — H35.033: ICD-10-CM

## 2024-05-11 PROBLEM — Z96.1 PSEUDOPHAKIA: Status: ACTIVE | Noted: 2024-05-03

## 2024-05-11 PROCEDURE — 67028 INJECTION EYE DRUG: CPT | Mod: 79,LT | Performed by: OPHTHALMOLOGY

## 2024-05-11 PROCEDURE — 92134 CPTRZ OPH DX IMG PST SGM RTA: CPT | Performed by: OPHTHALMOLOGY

## 2024-05-11 ASSESSMENT — CONFRONTATIONAL VISUAL FIELD TEST (CVF)
OS_FINDINGS: FULL
OD_FINDINGS: FULL

## 2024-05-11 ASSESSMENT — LID EXAM ASSESSMENTS
OD_COMMENTS: 1 LASH K TOUCHING S/P EPILATION
OD_TRICHIASIS: RLL T

## 2024-06-26 ENCOUNTER — OFFICE (OUTPATIENT)
Dept: URBAN - METROPOLITAN AREA CLINIC 94 | Facility: CLINIC | Age: 59
Setting detail: OPHTHALMOLOGY
End: 2024-06-26
Payer: COMMERCIAL

## 2024-06-26 DIAGNOSIS — E11.3513: ICD-10-CM

## 2024-06-26 DIAGNOSIS — H35.033: ICD-10-CM

## 2024-06-26 DIAGNOSIS — E11.3512: ICD-10-CM

## 2024-06-26 PROCEDURE — 92134 CPTRZ OPH DX IMG PST SGM RTA: CPT | Performed by: OPHTHALMOLOGY

## 2024-06-26 PROCEDURE — 92014 COMPRE OPH EXAM EST PT 1/>: CPT | Mod: 57 | Performed by: OPHTHALMOLOGY

## 2024-06-26 PROCEDURE — 67210 TREATMENT OF RETINAL LESION: CPT | Mod: LT | Performed by: OPHTHALMOLOGY

## 2024-06-26 ASSESSMENT — LID EXAM ASSESSMENTS
OD_COMMENTS: 1 LASH K TOUCHING S/P EPILATION
OD_TRICHIASIS: RLL T

## 2024-06-26 ASSESSMENT — CONFRONTATIONAL VISUAL FIELD TEST (CVF)
OS_FINDINGS: FULL
OD_FINDINGS: FULL

## 2024-06-29 ENCOUNTER — OFFICE (OUTPATIENT)
Dept: URBAN - METROPOLITAN AREA CLINIC 94 | Facility: CLINIC | Age: 59
Setting detail: OPHTHALMOLOGY
End: 2024-06-29
Payer: COMMERCIAL

## 2024-06-29 DIAGNOSIS — H35.033: ICD-10-CM

## 2024-06-29 DIAGNOSIS — E11.3512: ICD-10-CM

## 2024-06-29 PROCEDURE — 67028 INJECTION EYE DRUG: CPT | Mod: 58,LT | Performed by: OPHTHALMOLOGY

## 2024-06-29 PROCEDURE — 92134 CPTRZ OPH DX IMG PST SGM RTA: CPT | Performed by: OPHTHALMOLOGY

## 2024-06-29 ASSESSMENT — LID EXAM ASSESSMENTS
OD_COMMENTS: 1 LASH K TOUCHING S/P EPILATION
OD_TRICHIASIS: RLL T

## 2024-06-29 ASSESSMENT — CONFRONTATIONAL VISUAL FIELD TEST (CVF)
OS_FINDINGS: FULL
OD_FINDINGS: FULL

## 2024-07-16 ENCOUNTER — OFFICE (OUTPATIENT)
Dept: URBAN - METROPOLITAN AREA CLINIC 94 | Facility: CLINIC | Age: 59
Setting detail: OPHTHALMOLOGY
End: 2024-07-16
Payer: COMMERCIAL

## 2024-07-16 DIAGNOSIS — E11.3512: ICD-10-CM

## 2024-07-16 PROCEDURE — 92134 CPTRZ OPH DX IMG PST SGM RTA: CPT | Performed by: OPHTHALMOLOGY

## 2024-07-16 PROCEDURE — 67228 TREATMENT X10SV RETINOPATHY: CPT | Mod: 58,LT | Performed by: OPHTHALMOLOGY

## 2024-07-16 PROCEDURE — 99024 POSTOP FOLLOW-UP VISIT: CPT | Performed by: OPHTHALMOLOGY

## 2024-07-16 ASSESSMENT — CONFRONTATIONAL VISUAL FIELD TEST (CVF)
OS_FINDINGS: FULL
OD_FINDINGS: FULL

## 2024-07-16 ASSESSMENT — LID EXAM ASSESSMENTS
OD_COMMENTS: 1 LASH K TOUCHING S/P EPILATION
OD_TRICHIASIS: RLL T

## 2024-08-02 ENCOUNTER — OFFICE (OUTPATIENT)
Dept: URBAN - METROPOLITAN AREA CLINIC 94 | Facility: CLINIC | Age: 59
Setting detail: OPHTHALMOLOGY
End: 2024-08-02
Payer: MEDICAID

## 2024-08-02 DIAGNOSIS — H40.89: ICD-10-CM

## 2024-08-02 DIAGNOSIS — Z96.1: ICD-10-CM

## 2024-08-02 DIAGNOSIS — H16.223: ICD-10-CM

## 2024-08-02 PROCEDURE — 92012 INTRM OPH EXAM EST PATIENT: CPT | Mod: 24 | Performed by: OPHTHALMOLOGY

## 2024-08-02 PROCEDURE — 92250 FUNDUS PHOTOGRAPHY W/I&R: CPT | Performed by: OPHTHALMOLOGY

## 2024-08-02 ASSESSMENT — LID EXAM ASSESSMENTS
OD_COMMENTS: 1 LASH K TOUCHING S/P EPILATION
OD_TRICHIASIS: RLL T

## 2024-08-02 ASSESSMENT — CONFRONTATIONAL VISUAL FIELD TEST (CVF)
OD_FINDINGS: FULL
OS_FINDINGS: FULL

## 2024-08-10 ENCOUNTER — OFFICE (OUTPATIENT)
Dept: URBAN - METROPOLITAN AREA CLINIC 94 | Facility: CLINIC | Age: 59
Setting detail: OPHTHALMOLOGY
End: 2024-08-10
Payer: MEDICAID

## 2024-08-10 DIAGNOSIS — H35.033: ICD-10-CM

## 2024-08-10 DIAGNOSIS — E11.3512: ICD-10-CM

## 2024-08-10 PROCEDURE — 67028 INJECTION EYE DRUG: CPT | Mod: 58,LT | Performed by: OPHTHALMOLOGY

## 2024-08-10 PROCEDURE — 92134 CPTRZ OPH DX IMG PST SGM RTA: CPT | Performed by: OPHTHALMOLOGY

## 2024-08-10 ASSESSMENT — CONFRONTATIONAL VISUAL FIELD TEST (CVF)
OS_FINDINGS: FULL
OD_FINDINGS: FULL

## 2024-08-10 ASSESSMENT — LID EXAM ASSESSMENTS
OD_TRICHIASIS: RLL T
OD_COMMENTS: 1 LASH K TOUCHING S/P EPILATION

## 2024-08-14 ENCOUNTER — OFFICE (OUTPATIENT)
Dept: URBAN - METROPOLITAN AREA CLINIC 112 | Facility: CLINIC | Age: 59
Setting detail: OPHTHALMOLOGY
End: 2024-08-14
Payer: MEDICAID

## 2024-08-14 DIAGNOSIS — H26.491: ICD-10-CM

## 2024-08-14 DIAGNOSIS — H16.223: ICD-10-CM

## 2024-08-14 PROCEDURE — 99213 OFFICE O/P EST LOW 20 MIN: CPT | Mod: 24 | Performed by: OPHTHALMOLOGY

## 2024-08-14 ASSESSMENT — CONFRONTATIONAL VISUAL FIELD TEST (CVF)
OS_FINDINGS: FULL
OD_FINDINGS: FULL

## 2024-08-14 ASSESSMENT — LID EXAM ASSESSMENTS
OD_TRICHIASIS: RLL T
OD_COMMENTS: 1 LASH K TOUCHING S/P EPILATION

## 2024-09-14 ENCOUNTER — OFFICE (OUTPATIENT)
Dept: URBAN - METROPOLITAN AREA CLINIC 94 | Facility: CLINIC | Age: 59
Setting detail: OPHTHALMOLOGY
End: 2024-09-14
Payer: MEDICAID

## 2024-09-14 DIAGNOSIS — H35.033: ICD-10-CM

## 2024-09-14 DIAGNOSIS — E11.3512: ICD-10-CM

## 2024-09-14 PROCEDURE — 92134 CPTRZ OPH DX IMG PST SGM RTA: CPT | Performed by: OPHTHALMOLOGY

## 2024-09-14 PROCEDURE — 67028 INJECTION EYE DRUG: CPT | Mod: 79,LT | Performed by: OPHTHALMOLOGY

## 2024-09-14 ASSESSMENT — LID EXAM ASSESSMENTS
OD_COMMENTS: 1 LASH K TOUCHING S/P EPILATION
OD_TRICHIASIS: RLL T

## 2024-11-23 ENCOUNTER — OFFICE (OUTPATIENT)
Dept: URBAN - METROPOLITAN AREA CLINIC 94 | Facility: CLINIC | Age: 59
Setting detail: OPHTHALMOLOGY
End: 2024-11-23
Payer: MEDICAID

## 2024-11-23 DIAGNOSIS — E11.3512: ICD-10-CM

## 2024-11-23 DIAGNOSIS — E11.3513: ICD-10-CM

## 2024-11-23 DIAGNOSIS — H35.033: ICD-10-CM

## 2024-11-23 PROCEDURE — 92134 CPTRZ OPH DX IMG PST SGM RTA: CPT | Performed by: OPHTHALMOLOGY

## 2024-11-23 PROCEDURE — 67210 TREATMENT OF RETINAL LESION: CPT | Mod: LT | Performed by: OPHTHALMOLOGY

## 2024-11-23 PROCEDURE — 92014 COMPRE OPH EXAM EST PT 1/>: CPT | Mod: 57 | Performed by: OPHTHALMOLOGY

## 2024-11-23 ASSESSMENT — KERATOMETRY
OD_K1POWER_DIOPTERS: 41.75
OD_AXISANGLE_DEGREES: 088
OS_AXISANGLE_DEGREES: 027
OD_K2POWER_DIOPTERS: 44.75
OS_K2POWER_DIOPTERS: 44.75
OS_K1POWER_DIOPTERS: 43.50

## 2024-11-23 ASSESSMENT — SUPERFICIAL PUNCTATE KERATITIS (SPK)
OD_SPK: T
OS_SPK: T

## 2024-11-23 ASSESSMENT — REFRACTION_AUTOREFRACTION
OS_CYLINDER: -0.75
OS_SPHERE: 0.00
OD_CYLINDER: -0.50
OS_AXIS: 123
OD_AXIS: 020
OD_SPHERE: -2.00

## 2024-11-23 ASSESSMENT — VISUAL ACUITY
OS_BCVA: NLP
OD_BCVA: 20/40

## 2024-11-23 ASSESSMENT — CORNEAL TRAUMA - ABRASION: OD_ABRASION: ABSENT

## 2024-11-23 ASSESSMENT — CONFRONTATIONAL VISUAL FIELD TEST (CVF)
OS_FINDINGS: FULL
OD_FINDINGS: FULL

## 2024-11-23 ASSESSMENT — LID EXAM ASSESSMENTS
OD_COMMENTS: 1 LASH K TOUCHING S/P EPILATION
OD_TRICHIASIS: RLL T

## 2024-11-23 ASSESSMENT — TONOMETRY: OS_IOP_MMHG: 14

## 2025-01-11 ENCOUNTER — OFFICE (OUTPATIENT)
Dept: URBAN - METROPOLITAN AREA CLINIC 94 | Facility: CLINIC | Age: 60
Setting detail: OPHTHALMOLOGY
End: 2025-01-11
Payer: MEDICAID

## 2025-01-11 DIAGNOSIS — E11.3512: ICD-10-CM

## 2025-01-11 DIAGNOSIS — H35.033: ICD-10-CM

## 2025-01-11 PROCEDURE — 67028 INJECTION EYE DRUG: CPT | Mod: 58,LT | Performed by: OPHTHALMOLOGY

## 2025-01-11 PROCEDURE — 92134 CPTRZ OPH DX IMG PST SGM RTA: CPT | Performed by: OPHTHALMOLOGY

## 2025-01-11 ASSESSMENT — REFRACTION_AUTOREFRACTION
OS_SPHERE: 0.00
OS_AXIS: 123
OD_SPHERE: -2.00
OS_CYLINDER: -0.75
OD_CYLINDER: -0.50
OD_AXIS: 020

## 2025-01-11 ASSESSMENT — KERATOMETRY
OS_K1POWER_DIOPTERS: 43.50
OS_AXISANGLE_DEGREES: 027
OD_K1POWER_DIOPTERS: 41.75
OD_K2POWER_DIOPTERS: 44.75
OS_K2POWER_DIOPTERS: 44.75
OD_AXISANGLE_DEGREES: 088

## 2025-01-11 ASSESSMENT — VISUAL ACUITY
OS_BCVA: NLP
OD_BCVA: 20/30

## 2025-01-11 ASSESSMENT — CORNEAL TRAUMA - ABRASION: OD_ABRASION: ABSENT

## 2025-01-11 ASSESSMENT — LID EXAM ASSESSMENTS
OD_COMMENTS: 1 LASH K TOUCHING S/P EPILATION
OD_TRICHIASIS: RLL T

## 2025-01-11 ASSESSMENT — TONOMETRY
OD_IOP_MMHG: 15
OS_IOP_MMHG: 13

## 2025-01-11 ASSESSMENT — CONFRONTATIONAL VISUAL FIELD TEST (CVF)
OS_FINDINGS: FULL
OD_FINDINGS: FULL

## 2025-01-11 ASSESSMENT — SUPERFICIAL PUNCTATE KERATITIS (SPK)
OS_SPK: T
OD_SPK: T

## 2025-02-07 ENCOUNTER — OFFICE (OUTPATIENT)
Dept: URBAN - METROPOLITAN AREA CLINIC 94 | Facility: CLINIC | Age: 60
Setting detail: OPHTHALMOLOGY
End: 2025-02-07
Payer: MEDICAID

## 2025-02-07 DIAGNOSIS — H40.89: ICD-10-CM

## 2025-02-07 DIAGNOSIS — H35.033: ICD-10-CM

## 2025-02-07 PROCEDURE — 92133 CPTRZD OPH DX IMG PST SGM ON: CPT | Performed by: OPHTHALMOLOGY

## 2025-02-07 PROCEDURE — 92012 INTRM OPH EXAM EST PATIENT: CPT | Mod: 24 | Performed by: OPHTHALMOLOGY

## 2025-02-07 ASSESSMENT — TONOMETRY
OS_IOP_MMHG: 18
OD_IOP_MMHG: 20

## 2025-02-07 ASSESSMENT — REFRACTION_AUTOREFRACTION
OS_AXIS: 135
OS_CYLINDER: -1.00
OS_SPHERE: 0.00
OD_SPHERE: -0.75
OD_AXIS: 016
OD_CYLINDER: -1.75

## 2025-02-07 ASSESSMENT — VISUAL ACUITY
OS_BCVA: NLP
OD_BCVA: 20/40

## 2025-02-07 ASSESSMENT — LID EXAM ASSESSMENTS
OD_COMMENTS: 1 LASH K TOUCHING S/P EPILATION
OD_TRICHIASIS: RLL T

## 2025-02-07 ASSESSMENT — KERATOMETRY
OD_K2POWER_DIOPTERS: 43.75
OS_K1POWER_DIOPTERS: 43.75
OD_AXISANGLE_DEGREES: 061
OD_K1POWER_DIOPTERS: 42.75
OS_AXISANGLE_DEGREES: 048
OS_K2POWER_DIOPTERS: 44.25

## 2025-02-07 ASSESSMENT — SUPERFICIAL PUNCTATE KERATITIS (SPK)
OD_SPK: T
OS_SPK: T

## 2025-02-07 ASSESSMENT — CORNEAL TRAUMA - ABRASION: OD_ABRASION: ABSENT

## 2025-02-07 ASSESSMENT — CONFRONTATIONAL VISUAL FIELD TEST (CVF)
OD_FINDINGS: FULL
OS_FINDINGS: FULL

## 2025-03-15 ENCOUNTER — RX ONLY (RX ONLY)
Age: 60
End: 2025-03-15

## 2025-03-15 ENCOUNTER — OFFICE (OUTPATIENT)
Dept: URBAN - METROPOLITAN AREA CLINIC 94 | Facility: CLINIC | Age: 60
Setting detail: OPHTHALMOLOGY
End: 2025-03-15
Payer: MEDICAID

## 2025-03-15 DIAGNOSIS — H35.033: ICD-10-CM

## 2025-03-15 DIAGNOSIS — E11.3512: ICD-10-CM

## 2025-03-15 DIAGNOSIS — E11.3513: ICD-10-CM

## 2025-03-15 PROCEDURE — 92235 FLUORESCEIN ANGRPH MLTIFRAME: CPT | Performed by: OPHTHALMOLOGY

## 2025-03-15 PROCEDURE — 92134 CPTRZ OPH DX IMG PST SGM RTA: CPT | Performed by: OPHTHALMOLOGY

## 2025-03-15 PROCEDURE — 67028 INJECTION EYE DRUG: CPT | Mod: LT | Performed by: OPHTHALMOLOGY

## 2025-03-15 ASSESSMENT — VISUAL ACUITY
OD_BCVA: 20/40
OS_BCVA: NLP

## 2025-03-15 ASSESSMENT — KERATOMETRY
OD_K1POWER_DIOPTERS: 42.75
OS_K2POWER_DIOPTERS: 44.25
OD_AXISANGLE_DEGREES: 061
OS_K1POWER_DIOPTERS: 43.75
OS_AXISANGLE_DEGREES: 048
OD_K2POWER_DIOPTERS: 43.75

## 2025-03-15 ASSESSMENT — TONOMETRY
OS_IOP_MMHG: 18
OD_IOP_MMHG: 18

## 2025-03-15 ASSESSMENT — LID EXAM ASSESSMENTS
OD_TRICHIASIS: RLL T
OD_COMMENTS: 1 LASH K TOUCHING S/P EPILATION

## 2025-03-15 ASSESSMENT — REFRACTION_AUTOREFRACTION
OS_CYLINDER: -1.00
OS_SPHERE: 0.00
OS_AXIS: 135
OD_SPHERE: -0.75
OD_AXIS: 016
OD_CYLINDER: -1.75

## 2025-03-15 ASSESSMENT — SUPERFICIAL PUNCTATE KERATITIS (SPK)
OS_SPK: T
OD_SPK: T

## 2025-03-15 ASSESSMENT — CORNEAL TRAUMA - ABRASION: OD_ABRASION: ABSENT

## 2025-03-15 ASSESSMENT — CONFRONTATIONAL VISUAL FIELD TEST (CVF)
OD_FINDINGS: FULL
OS_FINDINGS: FULL

## 2025-04-26 ENCOUNTER — ASC (OUTPATIENT)
Dept: URBAN - METROPOLITAN AREA SURGERY 8 | Facility: SURGERY | Age: 60
Setting detail: OPHTHALMOLOGY
End: 2025-04-26
Payer: MEDICAID

## 2025-04-26 DIAGNOSIS — E11.3512: ICD-10-CM

## 2025-04-26 PROCEDURE — 67210 TREATMENT OF RETINAL LESION: CPT | Mod: LT | Performed by: OPHTHALMOLOGY

## 2025-04-26 ASSESSMENT — REFRACTION_AUTOREFRACTION
OD_CYLINDER: -1.75
OD_AXIS: 016
OS_CYLINDER: -1.00
OS_SPHERE: 0.00
OS_AXIS: 135
OD_SPHERE: -0.75

## 2025-04-26 ASSESSMENT — KERATOMETRY
OS_AXISANGLE_DEGREES: 048
OD_AXISANGLE_DEGREES: 061
OS_K1POWER_DIOPTERS: 43.75
OS_K2POWER_DIOPTERS: 44.25
OD_K2POWER_DIOPTERS: 43.75
OD_K1POWER_DIOPTERS: 42.75

## 2025-04-26 ASSESSMENT — CONFRONTATIONAL VISUAL FIELD TEST (CVF)
OD_FINDINGS: FULL
OS_FINDINGS: FULL

## 2025-04-26 ASSESSMENT — VISUAL ACUITY
OD_BCVA: 20/40
OS_BCVA: NLP

## 2025-04-26 ASSESSMENT — TONOMETRY: OS_IOP_MMHG: 16

## 2025-04-26 ASSESSMENT — LID EXAM ASSESSMENTS
OD_TRICHIASIS: RLL T
OD_COMMENTS: 1 LASH K TOUCHING S/P EPILATION

## 2025-04-26 ASSESSMENT — SUPERFICIAL PUNCTATE KERATITIS (SPK)
OD_SPK: T
OS_SPK: T

## 2025-04-26 ASSESSMENT — CORNEAL TRAUMA - ABRASION: OD_ABRASION: ABSENT

## 2025-05-09 ENCOUNTER — OFFICE (OUTPATIENT)
Dept: URBAN - METROPOLITAN AREA CLINIC 112 | Facility: CLINIC | Age: 60
Setting detail: OPHTHALMOLOGY
End: 2025-05-09
Payer: COMMERCIAL

## 2025-05-09 DIAGNOSIS — H16.223: ICD-10-CM

## 2025-05-09 PROBLEM — H52.4 PRESBYOPIA: Status: ACTIVE | Noted: 2025-05-09

## 2025-05-09 PROCEDURE — 99212 OFFICE O/P EST SF 10 MIN: CPT | Mod: 24 | Performed by: OPTOMETRIST

## 2025-05-09 ASSESSMENT — CORNEAL TRAUMA - ABRASION: OD_ABRASION: ABSENT

## 2025-05-09 ASSESSMENT — REFRACTION_AUTOREFRACTION
OS_CYLINDER: -1.00
OD_CYLINDER: -4.50
OD_SPHERE: -2.25
OS_AXIS: 120
OS_SPHERE: -0.25
OD_AXIS: 175

## 2025-05-09 ASSESSMENT — REFRACTION_MANIFEST
OD_SPHERE: PLANO
OD_ADD: +2.25
OS_CYLINDER: SPH
OS_VA1: 20/60
OS_ADD: +2.25
OS_SPHERE: -0.50
OD_VA1: 20/NLP

## 2025-05-09 ASSESSMENT — KERATOMETRY
OS_K2POWER_DIOPTERS: 44.25
OS_AXISANGLE_DEGREES: 010
OD_K1POWER_DIOPTERS: 40.75
OD_AXISANGLE_DEGREES: 085
OD_K2POWER_DIOPTERS: 44.25
OS_K1POWER_DIOPTERS: 43.25

## 2025-05-09 ASSESSMENT — SUPERFICIAL PUNCTATE KERATITIS (SPK)
OD_SPK: T
OS_SPK: T

## 2025-05-09 ASSESSMENT — LID EXAM ASSESSMENTS
OD_TRICHIASIS: RLL T
OD_COMMENTS: 1 LASH K TOUCHING S/P EPILATION

## 2025-05-09 ASSESSMENT — CONFRONTATIONAL VISUAL FIELD TEST (CVF)
OD_FINDINGS: FULL
OS_FINDINGS: FULL

## 2025-05-09 ASSESSMENT — VISUAL ACUITY
OD_BCVA: 20/60
OS_BCVA: NLP

## 2025-05-09 ASSESSMENT — TONOMETRY: OS_IOP_MMHG: 19

## 2025-06-17 ENCOUNTER — OFFICE (OUTPATIENT)
Dept: URBAN - METROPOLITAN AREA CLINIC 94 | Facility: CLINIC | Age: 60
Setting detail: OPHTHALMOLOGY
End: 2025-06-17
Payer: COMMERCIAL

## 2025-06-17 DIAGNOSIS — H35.033: ICD-10-CM

## 2025-06-17 DIAGNOSIS — E11.3512: ICD-10-CM

## 2025-06-17 PROCEDURE — 92134 CPTRZ OPH DX IMG PST SGM RTA: CPT | Performed by: OPHTHALMOLOGY

## 2025-06-17 PROCEDURE — 67028 INJECTION EYE DRUG: CPT | Mod: 58,LT | Performed by: OPHTHALMOLOGY

## 2025-06-17 ASSESSMENT — REFRACTION_MANIFEST
OS_ADD: +2.25
OD_VA1: 20/NLP
OD_ADD: +2.25
OS_VA1: 20/60
OS_CYLINDER: SPH
OS_SPHERE: -0.50
OD_SPHERE: PLANO

## 2025-06-17 ASSESSMENT — REFRACTION_AUTOREFRACTION
OD_AXIS: 175
OS_SPHERE: -0.25
OS_CYLINDER: -1.00
OS_AXIS: 120
OD_SPHERE: -2.25
OD_CYLINDER: -4.50

## 2025-06-17 ASSESSMENT — KERATOMETRY
OD_AXISANGLE_DEGREES: 085
OD_K1POWER_DIOPTERS: 40.75
OS_AXISANGLE_DEGREES: 010
OS_K1POWER_DIOPTERS: 43.25
OS_K2POWER_DIOPTERS: 44.25
OD_K2POWER_DIOPTERS: 44.25

## 2025-06-17 ASSESSMENT — SUPERFICIAL PUNCTATE KERATITIS (SPK)
OD_SPK: T
OS_SPK: T

## 2025-06-17 ASSESSMENT — LID EXAM ASSESSMENTS
OD_COMMENTS: 1 LASH K TOUCHING S/P EPILATION
OD_TRICHIASIS: RLL T

## 2025-06-17 ASSESSMENT — TONOMETRY: OS_IOP_MMHG: 16

## 2025-06-17 ASSESSMENT — CONFRONTATIONAL VISUAL FIELD TEST (CVF)
OS_FINDINGS: FULL
OD_FINDINGS: FULL

## 2025-06-17 ASSESSMENT — CORNEAL TRAUMA - ABRASION: OD_ABRASION: ABSENT

## 2025-06-17 ASSESSMENT — VISUAL ACUITY
OS_BCVA: NLP
OD_BCVA: 20/40

## 2025-08-08 ENCOUNTER — OFFICE (OUTPATIENT)
Dept: URBAN - METROPOLITAN AREA CLINIC 94 | Facility: CLINIC | Age: 60
Setting detail: OPHTHALMOLOGY
End: 2025-08-08
Payer: COMMERCIAL

## 2025-08-08 DIAGNOSIS — H40.89: ICD-10-CM

## 2025-08-08 PROCEDURE — 92014 COMPRE OPH EXAM EST PT 1/>: CPT | Performed by: OPHTHALMOLOGY

## 2025-08-08 PROCEDURE — 92250 FUNDUS PHOTOGRAPHY W/I&R: CPT | Performed by: OPHTHALMOLOGY

## 2025-08-08 ASSESSMENT — REFRACTION_MANIFEST
OS_ADD: +2.25
OD_SPHERE: PLANO
OD_ADD: +2.25
OS_CYLINDER: SPH
OS_VA1: 20/60
OD_VA1: 20/NLP
OS_SPHERE: -0.50

## 2025-08-08 ASSESSMENT — CONFRONTATIONAL VISUAL FIELD TEST (CVF)
OD_FINDINGS: FULL
OS_FINDINGS: FULL

## 2025-08-08 ASSESSMENT — TONOMETRY
OD_IOP_MMHG: 13
OS_IOP_MMHG: 15

## 2025-08-08 ASSESSMENT — LID EXAM ASSESSMENTS
OD_TRICHIASIS: RLL T
OD_COMMENTS: 1 LASH K TOUCHING S/P EPILATION

## 2025-08-08 ASSESSMENT — KERATOMETRY
OD_AXISANGLE_DEGREES: 099
OD_K1POWER_DIOPTERS: 42.00
OD_K2POWER_DIOPTERS: 44.00
OS_AXISANGLE_DEGREES: 043
OS_K2POWER_DIOPTERS: 45.00
OS_K1POWER_DIOPTERS: 43.75

## 2025-08-08 ASSESSMENT — SUPERFICIAL PUNCTATE KERATITIS (SPK)
OD_SPK: T
OS_SPK: T

## 2025-08-08 ASSESSMENT — VISUAL ACUITY
OD_BCVA: 20/40
OS_BCVA: NLP

## 2025-08-08 ASSESSMENT — REFRACTION_AUTOREFRACTION
OS_CYLINDER: -1.00
OS_AXIS: 126
OS_SPHERE: -0.25

## 2025-08-08 ASSESSMENT — CORNEAL TRAUMA - ABRASION: OD_ABRASION: ABSENT

## 2025-08-09 ENCOUNTER — OFFICE (OUTPATIENT)
Dept: URBAN - METROPOLITAN AREA CLINIC 94 | Facility: CLINIC | Age: 60
Setting detail: OPHTHALMOLOGY
End: 2025-08-09
Payer: COMMERCIAL

## 2025-08-09 DIAGNOSIS — E11.3512: ICD-10-CM

## 2025-08-09 PROCEDURE — 67210 TREATMENT OF RETINAL LESION: CPT | Mod: LT | Performed by: OPHTHALMOLOGY

## 2025-08-09 ASSESSMENT — KERATOMETRY
OS_AXISANGLE_DEGREES: 043
OD_AXISANGLE_DEGREES: 099
OD_K2POWER_DIOPTERS: 44.00
OS_K2POWER_DIOPTERS: 45.00
OD_K1POWER_DIOPTERS: 42.00
OS_K1POWER_DIOPTERS: 43.75

## 2025-08-09 ASSESSMENT — CORNEAL TRAUMA - ABRASION: OD_ABRASION: ABSENT

## 2025-08-09 ASSESSMENT — SUPERFICIAL PUNCTATE KERATITIS (SPK)
OD_SPK: T
OS_SPK: T

## 2025-08-09 ASSESSMENT — REFRACTION_AUTOREFRACTION
OS_CYLINDER: -1.00
OS_SPHERE: -0.25
OS_AXIS: 126

## 2025-08-09 ASSESSMENT — VISUAL ACUITY
OD_BCVA: 20/30-1
OS_BCVA: NLP

## 2025-08-09 ASSESSMENT — LID EXAM ASSESSMENTS
OD_TRICHIASIS: RLL T
OD_COMMENTS: 1 LASH K TOUCHING S/P EPILATION

## 2025-08-09 ASSESSMENT — CONFRONTATIONAL VISUAL FIELD TEST (CVF)
OD_FINDINGS: FULL
OS_FINDINGS: FULL